# Patient Record
Sex: MALE | Race: WHITE | NOT HISPANIC OR LATINO | URBAN - METROPOLITAN AREA
[De-identification: names, ages, dates, MRNs, and addresses within clinical notes are randomized per-mention and may not be internally consistent; named-entity substitution may affect disease eponyms.]

---

## 2018-01-31 ENCOUNTER — AMBULATORY - RIVER FALLS (OUTPATIENT)
Dept: FAMILY MEDICINE | Facility: CLINIC | Age: 53
End: 2018-01-31

## 2020-11-23 ENCOUNTER — OFFICE VISIT - RIVER FALLS (OUTPATIENT)
Dept: FAMILY MEDICINE | Facility: CLINIC | Age: 55
End: 2020-11-23

## 2020-11-24 ENCOUNTER — AMBULATORY - RIVER FALLS (OUTPATIENT)
Dept: FAMILY MEDICINE | Facility: CLINIC | Age: 55
End: 2020-11-24

## 2020-11-27 LAB — SARS-COV-2 RNA SPEC QL NAA+PROBE: NOT DETECTED

## 2020-12-07 ENCOUNTER — COMMUNICATION - RIVER FALLS (OUTPATIENT)
Dept: FAMILY MEDICINE | Facility: CLINIC | Age: 55
End: 2020-12-07

## 2020-12-08 ENCOUNTER — OFFICE VISIT - RIVER FALLS (OUTPATIENT)
Dept: FAMILY MEDICINE | Facility: CLINIC | Age: 55
End: 2020-12-08

## 2020-12-08 ASSESSMENT — MIFFLIN-ST. JEOR: SCORE: 1451.35

## 2022-02-11 VITALS
BODY MASS INDEX: 22.76 KG/M2 | TEMPERATURE: 98.2 F | SYSTOLIC BLOOD PRESSURE: 118 MMHG | WEIGHT: 145 LBS | RESPIRATION RATE: 16 BRPM | DIASTOLIC BLOOD PRESSURE: 80 MMHG | OXYGEN SATURATION: 97 % | HEART RATE: 72 BPM | HEIGHT: 67 IN

## 2022-02-16 NOTE — NURSING NOTE
"Comprehensive Intake Entered On:  12/8/2020 9:19 AM CST    Performed On:  12/8/2020 9:11 AM CST by Matt Tabares CMA               Summary   Chief Complaint :   Pt here needing workmans comp/disability forms to completed regarding his \"negative covid test\"   Weight Measured :   145 lb(Converted to: 145 lb 0 oz, 65.771 kg)    Height Measured :   67 in(Converted to: 5 ft 7 in, 170.18 cm)    Body Mass Index :   22.71 kg/m2   Body Surface Area :   1.76 m2   Systolic Blood Pressure :   118 mmHg   Diastolic Blood Pressure :   80 mmHg   Mean Arterial Pressure :   93 mmHg   Peripheral Pulse Rate :   72 bpm   BP Site :   Right arm   Pulse Site :   Radial artery   Temperature Tympanic :   98.2 DegF(Converted to: 36.8 DegC)    Respiratory Rate :   16 br/min   Oxygen Saturation :   97 %   Matt Tabares CMA - 12/8/2020 9:11 AM CST   Health Status   Allergies Verified? :   Yes   Medication History Verified? :   Yes   Medical History Verified? :   Yes   Pre-Visit Planning Status :   Not completed   Tobacco Use? :   Current every day smoker   Tobacco Cessation Review :   Not ready to quit   Matt Tabares CMA - 12/8/2020 9:11 AM CST   Meds / Allergies   (As Of: 12/8/2020 9:19:28 AM CST)   Allergies (Active)   No Known Medication Allergies  Estimated Onset Date:   Unspecified ; Created By:   Yary Long RN; Reaction Status:   Active ; Category:   Drug ; Substance:   No Known Medication Allergies ; Type:   Allergy ; Updated By:   Yary Long RN; Reviewed Date:   12/8/2020 9:15 AM CST        Medication List   (As Of: 12/8/2020 9:19:28 AM CST)        ID Risk Screen   Recent Travel History :   No recent travel   Family Member Travel History :   No recent travel   Other Exposure to Infectious Disease :   Unknown   Matt Tabares CMA - 12/8/2020 9:11 AM CST   Social History   Social History   (As Of: 12/8/2020 9:19:28 AM CST)   Tobacco:        10 or more cigarettes (1/2 pack or more)/day in last 30 days   (Last Updated: " 11/23/2020 3:58:12 PM CST by Julisa Garcia CMA)          Electronic Cigarette/Vaping:        Electronic Cigarette Use: Never.   (Last Updated: 11/23/2020 3:58:34 PM CST by Julisa Garcia CMA)

## 2022-02-16 NOTE — TELEPHONE ENCOUNTER
---------------------  From: Annita Clemens RN   Sent: 11/28/2020 9:43:43 AM CST  Subject: COVID Result     Called patient and left message on identified voicemail informing him of negative COVID result.   Asked for return call if any questions or concerns.

## 2022-02-16 NOTE — NURSING NOTE
Comprehensive Intake Entered On:  11/23/2020 4:02 PM CST    Performed On:  11/23/2020 3:57 PM CST by Julisa Garcia CMA               Summary   Chief Complaint :   COVID symptoms; needs testing for work; verbal consent given for video visit   Julisa Garcia CMA - 11/23/2020 3:57 PM CST   Health Status   Allergies Verified? :   Yes   Medication History Verified? :   Yes   Medical History Verified? :   Yes   Tobacco Use? :   Current every day smoker   Julisa Garcia CMA - 11/23/2020 3:57 PM CST   Consents   Consent for Immunization Exchange :   Consent Granted   Consent for Immunizations to Providers :   Consent Granted   Julisa Garcia CMA - 11/23/2020 3:57 PM CST   Meds / Allergies   (As Of: 11/23/2020 4:02:59 PM CST)   Allergies (Active)   No Known Medication Allergies  Estimated Onset Date:   Unspecified ; Created By:   Yary Long RN; Reaction Status:   Active ; Category:   Drug ; Substance:   No Known Medication Allergies ; Type:   Allergy ; Updated By:   Yary Long RN; Reviewed Date:   11/23/2020 3:58 PM CST        Medication List   (As Of: 11/23/2020 4:02:59 PM CST)   No Known Home Medications     Julisa Garcia CMA - 11/23/2020 3:58:25 PM           ID Risk Screen   Recent Travel History :   No recent travel   Family Member Travel History :   No recent travel   Other Exposure to Infectious Disease :   Community exposure to COVID-19 within the last 14 days   Julisa Garcia CMA - 11/23/2020 3:57 PM CST   Social History   Social History   (As Of: 11/23/2020 4:02:59 PM CST)   Tobacco:        10 or more cigarettes (1/2 pack or more)/day in last 30 days   (Last Updated: 11/23/2020 3:58:12 PM CST by Julisa Garcia CMA)          Electronic Cigarette/Vaping:        Electronic Cigarette Use: Never.   (Last Updated: 11/23/2020 3:58:34 PM CST by Julisa Garcia CMA)            OB/GYN History   Pregnancy History   (As Of: 11/23/2020 4:02:59 PM CST)   No pregnancy history documented

## 2022-02-16 NOTE — TELEPHONE ENCOUNTER
---------------------  From: Zachariah Eldridge PA-C   To: Appointment Pool ([id number]);     Sent: 11/23/2020 4:20:59 PM CST  Subject: General Message     Please put on tomorrow's C-19 testing schedule.    KAHScheduled

## 2022-02-16 NOTE — TELEPHONE ENCOUNTER
---------------------  From: Dede Vital (Phone Messages Pool ([id number]_[facility]))   To: [facility] Message Pool ([id number]);     Sent: 12/7/2020 12:59:20 PM CST  Subject: PPW     Wife Yesi dropped off Statement of incapacity/attending provider statement and FMLA ppw to be filled out. Called and left vm to call back. Pt would need some kind of appt. Nothing in chart regarding this. Put forms in [facility] box per wife request.Pt seen by CHETAN to have ppwk filled out.

## 2022-02-16 NOTE — NURSING NOTE
[facility]  O2 sat: 94%.  Specimen sent to [facility] lab.  Forms faxed to [facility] within [facility].

## 2022-02-16 NOTE — PROGRESS NOTES
"   Patient:   CONSTANCE JONAS            MRN: [id number]            FIN: [id number]               Age:   55 years     Sex:  Male     :  1965   Associated Diagnoses:   Close exposure to COVID-19 virus   Author:   Ryan Wyatt PA-C      Chief Complaint   2020 9:11 AM CST    Pt here needing workmans comp/disability forms to completed regarding his \"negative covid test\"      History of Present Illness   Chief complaint and symptoms noted above and confirmed with patient    He potentially had Covid exposure at work and he developed a cough and headache  He was tested for Covid on , test was negative, but he had to quarantine and be off work through          Health Status   Allergies:    Allergic Reactions (Selected)  No Known Medication Allergies   Medications:    Medications          No medications documented     Problem list:    All Problems  Tobacco user / SNOMED CT 068920478 / Probable      Histories   Past Medical History:    No active or resolved past medical history items have been selected or recorded.   Family History:    No family history items have been selected or recorded.   Procedure history:    No active procedure history items have been selected or recorded.   Social History:        Electronic Cigarette/Vaping Assessment            Electronic Cigarette Use: Never.      Tobacco Assessment            10 or more cigarettes (1/2 pack or more)/day in last 30 days        Physical Examination   Vital Signs   2020 9:11 AM CST Temperature Tympanic 98.2 DegF    Peripheral Pulse Rate 72 bpm    Pulse Site Radial artery    Respiratory Rate 16 br/min    Systolic Blood Pressure 118 mmHg    Diastolic Blood Pressure 80 mmHg    Mean Arterial Pressure 93 mmHg    BP Site Right arm    Oxygen Saturation 97 %      Measurements from flowsheet : Measurements   2020 9:11 AM CST Height Measured - Standard 67 in    Weight Measured - Standard 145 lb    BSA 1.76 m2    Body Mass Index 22.71 kg/m2       "   Impression and Plan   Diagnosis     Close exposure to COVID-19 virus ([id number] Z20.828).     Course:  Resolved.    Plan:  total visit time (15 min) all spent in filling out [facility] paperwork.    Orders     Orders   Charges (Evaluation and Management):  50671 office outpatient visit 15 minutes (Charge) (Order): Quantity: 1, Close exposure to COVID-19 virus.

## 2022-02-16 NOTE — PROGRESS NOTES
Patient:   CONSTANCE JONAS            MRN: [id number]            FIN: [id number]               Age:   55 years     Sex:  Male     :  1965   Associated Diagnoses:   Close exposure to 2019 novel coronavirus   Author:   Zachariah Eldridge PA-C      Visit Information      Date of Service: 2020 03:21 pm  Performing Location: [facility]  Encounter#: [id number]      Primary Care Provider (PCP):  NONE ,       Referring Provider:  Zachariah Eldridge PA-C    NPI# [id number]   Visit type:  Video Visit via [email or web address] or [email or web address].    Participants in room during visit:  2   Location of patient:  Home  Location of provider:  _ (Clinic office )  Video Start Time:  161  Video End Time:   162    Today's visit was conducted via video conference due to the COVID-19 pandemic.  The patient's consent to proceed with a video visit has been obtained and documented.      Chief Complaint   Headache. Nausea. No fever      History of Present Illness   Patient is a 55 year old M who is being evaluated via a billable video visit.  No fever or chills.. Work exposure to C-19. No other symptoms. Smoker. No chronic illnesses. Work needs him to be tested.      Review of Systems   Constitutional:  Negative.    Eye:  Negative.    Ear/Nose/Mouth/Throat:  Nasal congestion.    Respiratory:  Cough.    Cardiovascular:  Negative.    Gastrointestinal:  Nausea.    Genitourinary:  Negative.    Immunologic:  Negative.    Musculoskeletal:  Negative.    Integumentary:  Negative.    Neurologic:  Headache.    Psychiatric:  Negative.       Health Status   Allergies:    Allergic Reactions (Selected)  No Known Medication Allergies   Medications:  (Selected)      Problem list:    All Problems  Tobacco user / SNOMED CT 879171356 / Probable      Histories   Past Medical History:    No active or resolved past medical history items have been selected or recorded.   Family History:    No family history items have been selected or recorded.   Procedure  history:    No active procedure history items have been selected or recorded.   Social History:        Electronic Cigarette/Vaping Assessment            Electronic Cigarette Use: Never.      Tobacco Assessment            10 or more cigarettes (1/2 pack or more)/day in last 30 days        Physical Examination   General:  Alert and oriented, No acute distress.    Eye:  Pupils are equal, round and reactive to light, Normal conjunctiva.    HENT:  Oral mucosa is moist.    Neck:  Supple.    Respiratory:  Respirations are non-labored.    Psychiatric:  Cooperative, Appropriate mood & affect, Normal judgment.       Impression and Plan   Diagnosis     Close exposure to 2019 novel coronavirus ([id number] Z20.828).     Patient Instructions:       Counseled: Patient, Regarding treatment, Regarding medications, Diet, Activity, Verbalized understanding.    Summary:  set up for C-19 testing. Self quarantine. Return/call if symptoms worsening, chest tightness, SOB etc. Tylenol. Fluids..       Health Maintenance      Recommendations     Pending (in the next year)        OverDue           Influenza Vaccine due  08/31/20  and every 1  year(s)        Due            Alcohol Misuse Screen (Male) due  11/23/20  and every 1  year(s)           Aspirin Therapy for Prevention of CVD and CRC (Male) due  11/23/20  and every 5  year(s)           Body Mass Index Check (Male) due  11/23/20  and every 1  year(s)           Colorectal Cancer Screen (Colonoscopy) (Male) due  11/23/20  Variable frequency           Colorectal Cancer Screen (Occult Blood) (Male) due  11/23/20  Variable frequency           Colorectal Cancer Screen (Sigmoidoscopy) (Male) due  11/23/20  Variable frequency           Coronavirus (COVID-19) Suspected due  11/23/20  and every 1  day(s)           Depression Screen (Male) due  11/23/20  and every 1  year(s)           HIV Screen (if sexually active) (Male) due  11/23/20  and every 1  year(s)           High Blood Pressure Screen  (Male) due  11/23/20  and every 1  year(s)           Lipid Disorders Screen (Male) due  11/23/20  and every 1  year(s)           STD Counseling (if sexually active) (Male) due  11/23/20  and every 1  year(s)           Syphilis Screen (if sexually active) (Male) due  11/23/20  and every 1  year(s)           Tetanus Vaccine due  11/23/20  and every 10  year(s)     Satisfied (in the past 1 year)     There are no satisfied recommendations within the defined date range